# Patient Record
Sex: FEMALE | Race: BLACK OR AFRICAN AMERICAN | Employment: PART TIME | URBAN - METROPOLITAN AREA
[De-identification: names, ages, dates, MRNs, and addresses within clinical notes are randomized per-mention and may not be internally consistent; named-entity substitution may affect disease eponyms.]

---

## 2021-09-18 ENCOUNTER — HOSPITAL ENCOUNTER (EMERGENCY)
Age: 46
Discharge: HOME | End: 2021-09-18
Payer: MEDICAID

## 2021-09-18 VITALS
BODY MASS INDEX: 53.92 KG/M2 | HEART RATE: 107 BPM | SYSTOLIC BLOOD PRESSURE: 182 MMHG | TEMPERATURE: 99 F | OXYGEN SATURATION: 100 % | RESPIRATION RATE: 18 BRPM | HEIGHT: 62 IN | WEIGHT: 293 LBS | DIASTOLIC BLOOD PRESSURE: 85 MMHG

## 2021-09-18 DIAGNOSIS — L03.312 CELLULITIS OF BACK EXCEPT BUTTOCK: ICD-10-CM

## 2021-09-18 DIAGNOSIS — L02.91 ABSCESS: Primary | ICD-10-CM

## 2021-09-18 LAB
GLUCOSE BLD STRIP.AUTO-MCNC: 217 MG/DL (ref 65–117)
PERFORMED BY, TECHID: ABNORMAL

## 2021-09-18 PROCEDURE — 75810000289 HC I&D ABSCESS SIMP/COMP/MULT

## 2021-09-18 PROCEDURE — 82962 GLUCOSE BLOOD TEST: CPT

## 2021-09-18 PROCEDURE — 99282 EMERGENCY DEPT VISIT SF MDM: CPT

## 2021-09-18 RX ORDER — LISINOPRIL 40 MG/1
40 TABLET ORAL DAILY
COMMUNITY
Start: 2021-08-16

## 2021-09-18 RX ORDER — LIDOCAINE HYDROCHLORIDE 10 MG/ML
10 INJECTION INFILTRATION; PERINEURAL ONCE
Status: DISCONTINUED | OUTPATIENT
Start: 2021-09-18 | End: 2021-09-18 | Stop reason: HOSPADM

## 2021-09-18 RX ORDER — AMLODIPINE BESYLATE 5 MG/1
5 TABLET ORAL DAILY
COMMUNITY
Start: 2021-07-19

## 2021-09-18 RX ORDER — METFORMIN HYDROCHLORIDE 1000 MG/1
TABLET ORAL
COMMUNITY

## 2021-09-18 RX ORDER — CHLORTHALIDONE 25 MG/1
25 TABLET ORAL DAILY
COMMUNITY
Start: 2021-08-16

## 2021-09-18 RX ORDER — LIRAGLUTIDE 6 MG/ML
INJECTION SUBCUTANEOUS
COMMUNITY

## 2021-09-18 RX ORDER — CEPHALEXIN 500 MG/1
500 CAPSULE ORAL 4 TIMES DAILY
Qty: 28 CAPSULE | Refills: 0 | Status: SHIPPED | OUTPATIENT
Start: 2021-09-18 | End: 2021-09-25

## 2021-09-18 NOTE — DISCHARGE INSTRUCTIONS
Thank you! Thank you for allowing me to care for you in the emergency department. I sincerely hope that you are satisfied with your visit today. It is my goal to provide you with excellent care. Below you will find a list of your labs and imaging from your visit today. Should you have any questions regarding these results please do not hesitate to call the emergency department. Labs -     Recent Results (from the past 12 hour(s))   GLUCOSE, POC    Collection Time: 09/18/21  3:13 PM   Result Value Ref Range    Glucose (POC) 217 (H) 65 - 117 mg/dL    Performed by Kim Liu        Radiologic Studies -   No orders to display     CT Results  (Last 48 hours)      None          CXR Results  (Last 48 hours)      None               If you feel that you have not received excellent quality care or timely care, please ask to speak to the nurse manager. Please choose us in the future for your continued health care needs. ------------------------------------------------------------------------------------------------------------  The exam and treatment you received in the Emergency Department were for an urgent problem and are not intended as complete care. It is important that you follow-up with a doctor, nurse practitioner, or physician assistant to:  (1) confirm your diagnosis,  (2) re-evaluation of changes in your illness and treatment, and  (3) for ongoing care. If your symptoms become worse or you do not improve as expected and you are unable to reach your usual health care provider, you should return to the Emergency Department. We are available 24 hours a day. Please take your discharge instructions with you when you go to your follow-up appointment. If you have any problem arranging a follow-up appointment, contact the Emergency Department immediately. If a prescription has been provided, please have it filled as soon as possible to prevent a delay in treatment.  Read the entire medication instruction sheet provided to you by the pharmacy. If you have any questions or reservations about taking the medication due to side effects or interactions with other medications, please call your primary care physician or contact the ER to speak with the charge nurse. Make an appointment with your family doctor or the physician you were referred to for follow-up of this visit as instructed on your discharge paperwork, as this is a mandatory follow-up. Return to the ER if you are unable to be seen or if you are unable to be seen in a timely manner. If you have any problem arranging the follow-up visit, contact the Emergency Department immediately.

## 2021-09-18 NOTE — ED NOTES
EMERGENCY DEPARTMENT HISTORY AND PHYSICAL EXAM      Date: 9/18/2021  Patient Name: Nanette Fuentes    History of Presenting Illness     Chief Complaint   Patient presents with    Abscess    High Blood Sugar       History Provided By: Patient    HPI: Nanette Fuentes, 40 y.o. female with a past medical history significant diabetes, hypertension and obesity presents to the ED with cc of abscess. Pt reports a \"boil\" to her back x 3-4 days. She was evaluated at [facility] for the same yesterday but advised to come to ED when she was found to have a BG>200. She has no further concerns and specifically denies any fever, chills, cough, chest pain, shortness of breath, abdominal pain, nausea, vomit, diarrhea, changes in bowel or bladder habits, headaches, light headedness, dizziness, diaphoresis, or rash. There are no other complaints, changes, or physical findings at this time. PCP: Scot Brito NP    No current facility-administered medications on file prior to encounter. Current Outpatient Medications on File Prior to Encounter   Medication Sig Dispense Refill    metFORMIN (GLUCOPHAGE) 1,000 mg tablet metformin 1,000 mg tablet   Take 1 tablet(s) twice a day by oral route.  lisinopriL (PRINIVIL, ZESTRIL) 40 mg tablet Take 40 mg by mouth daily.  liraglutide (Victoza 2-Osmin) 0.6 mg/0.1 mL (18 mg/3 mL) pnij Victoza 2-Osmin 0.6 mg/0.1 mL (18 mg/3 mL) subcutaneous pen injector   INJECT 0.6MG SUBCUTANEOUSLY EVERYDAY FOR 1 WEEK THEN 1.2MG SUBCUTANEOUSLY EVERYDAY      amLODIPine (NORVASC) 5 mg tablet Take 5 mg by mouth daily.  chlorthalidone (HYGROTON) 25 mg tablet Take 25 mg by mouth daily. Past History     Past Medical History:  No past medical history on file. Past Surgical History:  No past surgical history on file. Family History:  No family history on file.     Social History:  Social History     Tobacco Use    Smoking status: Not on file   Substance Use Topics    Alcohol use: Not on file    Drug use: Not on file       Allergies:  No Known Allergies      Review of Systems     Review of Systems   Constitutional: Negative. Negative for chills, diaphoresis and fever. HENT: Negative. Negative for congestion, ear pain, rhinorrhea and sore throat. Eyes: Negative. Negative for pain. Respiratory: Negative. Negative for cough, chest tightness and shortness of breath. Cardiovascular: Negative. Negative for chest pain. Gastrointestinal: Negative. Negative for abdominal pain, diarrhea, nausea and vomiting. Genitourinary: Negative. Negative for difficulty urinating, dysuria, frequency and hematuria. Musculoskeletal: Negative. Skin: Negative for rash. Abscess left mid-back   Neurological: Negative. Negative for dizziness, weakness, light-headedness, numbness and headaches. Psychiatric/Behavioral: Negative. All other systems reviewed and are negative. Physical Exam     Physical Exam  Vitals and nursing note reviewed. Constitutional:       General: She is not in acute distress. Appearance: Normal appearance. She is morbidly obese. She is not ill-appearing or toxic-appearing. HENT:      Head: Normocephalic and atraumatic. Mouth/Throat:      Mouth: Mucous membranes are moist.      Pharynx: Oropharynx is clear. Eyes:      Extraocular Movements: Extraocular movements intact. Conjunctiva/sclera: Conjunctivae normal.      Pupils: Pupils are equal, round, and reactive to light. Pulmonary:      Effort: Pulmonary effort is normal.      Breath sounds: Normal breath sounds. No wheezing, rhonchi or rales. Abdominal:      General: There is no distension. Palpations: Abdomen is soft. Tenderness: There is no abdominal tenderness. There is no guarding or rebound. Musculoskeletal:      Cervical back: Neck supple. No tenderness. Skin:     General: Skin is warm and dry.       Capillary Refill: Capillary refill takes less than 2 seconds. Findings: Abscess (small area of fluctuance and induration over left mid-back. No surrounding erythema. ) present. No ecchymosis, erythema or rash. Neurological:      General: No focal deficit present. Mental Status: She is alert and oriented to person, place, and time. Psychiatric:         Mood and Affect: Mood normal.         Behavior: Behavior normal.         Lab and Diagnostic Study Results     Labs -     No results found for this or any previous visit (from the past 12 hour(s)). Radiologic Studies -   @lastxrresult@  CT Results  (Last 48 hours)    None        CXR Results  (Last 48 hours)    None            Medical Decision Making   - I am the first provider for this patient. - I reviewed the vital signs, available nursing notes, past medical history, past surgical history, family history and social history. - Initial assessment performed. The patients presenting problems have been discussed, and they are in agreement with the care plan formulated and outlined with them. I have encouraged them to ask questions as they arise throughout their visit. Vital Signs-Reviewed the patient's vital signs. No data found. Records Reviewed: Nursing Notes and Old Medical Records        ED Course:   3:07 PM  Pt reevaluated and reports significant pain relief following I&D. She has no new complaints or concerns and is ready to be discharged home. Provider Notes (Medical Decision Making):     MDM  Number of Diagnoses or Management Options  Abscess  Cellulitis of back except buttock  Diagnosis management comments: 39 y/o female with hx of diabetes and obesity presents with abscess to left mid-back. Reports a hx of prior similar. Nonseptic in appearance. No immune compromise, bullae, pain out of proportion, or rapid progression c/f necrotizing fasciitis. ED Intervention: uncomplicated I&D of abscess with acceptable resolution.     Rx: Keflex 500 mg four times daily x 7 days    Disposition: At this point, patient is stable for discharge, advised to follow up with PCP in 48 hours or return to ED if condition worsens. Amount and/or Complexity of Data Reviewed  Review and summarize past medical records: yes           Procedures   Medical Decision Makingedical Decision Making  Performed by: Corina Méndez PA-C  PROCEDURES:  I&D Tosha Simple    Date/Time: 9/18/2021 2:49 PM  Performed by: Nolvia Zaidi PA-C  Authorized by: Nolvia Zaidi PA-C     Consent:     Consent obtained:  Verbal    Consent given by:  Patient    Risks discussed:  Bleeding, incomplete drainage and pain    Alternatives discussed:  No treatment and delayed treatment  Location:     Type:  Abscess    Location:  Trunk    Trunk location:  Back  Pre-procedure details:     Skin preparation:  Betadine  Anesthesia (see MAR for exact dosages): Anesthesia method:  Local infiltration    Local anesthetic:  Lidocaine 1% w/o epi  Procedure type:     Complexity:  Simple  Procedure details:     Incision types:  Stab incision    Incision depth:  Dermal    Scalpel blade:  11    Wound management:  Probed and deloculated    Drainage:  Purulent    Drainage amount: Moderate    Wound treatment:  Wound left open    Packing materials:  None  Post-procedure details:     Patient tolerance of procedure: Tolerated well, no immediate complications           Disposition   Disposition: DC- Adult Discharges: All of the diagnostic tests were reviewed and questions answered. Diagnosis, care plan and treatment options were discussed. The patient understands the instructions and will follow up as directed. The patients results have been reviewed with them. They have been counseled regarding their diagnosis. The patient verbally convey understanding and agreement of the signs, symptoms, diagnosis, treatment and prognosis and additionally agrees to follow up as recommended with their PCP in 24 - 48 hours.   They also agree with the care-plan and convey that all of their questions have been answered. I have also put together some discharge instructions for them that include: 1) educational information regarding their diagnosis, 2) how to care for their diagnosis at home, as well a 3) list of reasons why they would want to return to the ED prior to their follow-up appointment, should their condition change. Discharged    DISCHARGE PLAN:  1. Current Discharge Medication List      CONTINUE these medications which have NOT CHANGED    Details   metFORMIN (GLUCOPHAGE) 1,000 mg tablet metformin 1,000 mg tablet   Take 1 tablet(s) twice a day by oral route. lisinopriL (PRINIVIL, ZESTRIL) 40 mg tablet Take 40 mg by mouth daily. liraglutide (Victoza 2-Osmin) 0.6 mg/0.1 mL (18 mg/3 mL) pnij Victoza 2-Osmin 0.6 mg/0.1 mL (18 mg/3 mL) subcutaneous pen injector   INJECT 0.6MG SUBCUTANEOUSLY EVERYDAY FOR 1 WEEK THEN 1.2MG SUBCUTANEOUSLY EVERYDAY      amLODIPine (NORVASC) 5 mg tablet Take 5 mg by mouth daily. chlorthalidone (HYGROTON) 25 mg tablet Take 25 mg by mouth daily. 2.   Follow-up Information     Follow up With Specialties Details Why Contact Info    Iain Guardado NP Nurse Practitioner  As needed, If symptoms worsen [address] [place] [id number] [phone]          3. Return to ED if worse   4. Discharge Medication List as of 9/18/2021  5:05 PM      START taking these medications    Details   cephALEXin (Keflex) 500 mg capsule Take 1 Capsule by mouth four (4) times daily for 7 days. Indications: an infection of the skin and the tissue below the skin, Normal, Disp-28 Capsule, R-0         CONTINUE these medications which have NOT CHANGED    Details   metFORMIN (GLUCOPHAGE) 1,000 mg tablet metformin 1,000 mg tablet   Take 1 tablet(s) twice a day by oral route., Historical Med      lisinopriL (PRINIVIL, ZESTRIL) 40 mg tablet Take 40 mg by mouth daily. , Historical Med      liraglutide (Victoza 2-Osmin) 0.6 mg/0.1 mL (18 mg/3 mL) pnij Victoza 2-Osmin 0.6 mg/0.1 mL (18 mg/3 mL) subcutaneous pen injector   INJECT 0.6MG SUBCUTANEOUSLY EVERYDAY FOR 1 WEEK THEN 1.2MG SUBCUTANEOUSLY EVERYDAY, Historical Med      amLODIPine (NORVASC) 5 mg tablet Take 5 mg by mouth daily. , Historical Med      chlorthalidone (HYGROTON) 25 mg tablet Take 25 mg by mouth daily. , Historical Med               Diagnosis     Clinical Impression:   1. Abscess    2. Cellulitis of back except buttock        Attestations:    Jenniffer Cates PA-C    Please note that this dictation was completed with [employer], the [employer] voice recognition software. Quite often unanticipated grammatical, syntax, homophones, and other interpretive errors are inadvertently transcribed by the computer software. Please disregard these errors. Please excuse any errors that have escaped final proofreading. Thank you.

## 2021-09-18 NOTE — ED NOTES
BS was 217 pt states that she take metformin for her DM but has started back drinking sodas again. Pt states that [facility] did nothing for her boil but advised her to come to ER to lower her BS. Yeast noted in back folds.

## 2021-09-18 NOTE — ED TRIAGE NOTES
Abscess to mid right  back  X 3 days , seen at [facility] yesterday who states she needed to come to the er for her bs of 268.

## 2023-05-24 RX ORDER — CHLORTHALIDONE 25 MG/1
25 TABLET ORAL DAILY
COMMUNITY
Start: 2021-08-16

## 2023-05-24 RX ORDER — AMLODIPINE BESYLATE 5 MG/1
5 TABLET ORAL DAILY
COMMUNITY
Start: 2021-07-19

## 2023-05-24 RX ORDER — LIRAGLUTIDE 6 MG/ML
INJECTION SUBCUTANEOUS
COMMUNITY

## 2023-05-24 RX ORDER — LISINOPRIL 40 MG/1
40 TABLET ORAL DAILY
COMMUNITY
Start: 2021-08-16